# Patient Record
Sex: MALE | Race: WHITE | Employment: STUDENT | URBAN - METROPOLITAN AREA
[De-identification: names, ages, dates, MRNs, and addresses within clinical notes are randomized per-mention and may not be internally consistent; named-entity substitution may affect disease eponyms.]

---
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
## 2024-07-30 ENCOUNTER — HOSPITAL ENCOUNTER (OUTPATIENT)
Age: 19
Discharge: HOME | End: 2024-07-30
Payer: COMMERCIAL

## 2024-07-30 ENCOUNTER — APPOINTMENT (OUTPATIENT)
Dept: GENERAL RADIOLOGY | Age: 19
End: 2024-07-30
Attending: NURSE PRACTITIONER
Payer: COMMERCIAL

## 2024-07-30 VITALS
TEMPERATURE: 98 F | RESPIRATION RATE: 20 BRPM | OXYGEN SATURATION: 98 % | HEIGHT: 73 IN | SYSTOLIC BLOOD PRESSURE: 127 MMHG | BODY MASS INDEX: 23.19 KG/M2 | DIASTOLIC BLOOD PRESSURE: 76 MMHG | HEART RATE: 67 BPM | WEIGHT: 175 LBS

## 2024-07-30 DIAGNOSIS — S99.921A RIGHT FOOT INJURY: Primary | ICD-10-CM

## 2024-07-30 PROCEDURE — 99203 OFFICE O/P NEW LOW 30 MIN: CPT | Performed by: NURSE PRACTITIONER

## 2024-07-30 PROCEDURE — A6448 LT COMPRES BAND <3"/YD: HCPCS | Performed by: NURSE PRACTITIONER

## 2024-07-30 PROCEDURE — 73630 X-RAY EXAM OF FOOT: CPT | Performed by: NURSE PRACTITIONER

## 2024-07-30 NOTE — DISCHARGE INSTRUCTIONS
Rest  Ice  Compress   Elevate    Take Aleve as directed.     Follow up with Podiatry if not improving in the next week.

## 2024-07-30 NOTE — ED PROVIDER NOTES
Patient Seen in: [facility]      History     Chief Complaint   Patient presents with    Foot Pain     Stated Complaint: right foot injury x 7 days    Subjective:   Right foot pain after playing soccer19-year-old male presents with 7 days ago and accidentally kicking another players shoe.  Patient applied ice and took Aleve the first 2 days.  Leaving for college in a couple of days.    Denies numbness or tingling.    The history is provided by the patient.           Objective:   Past Medical History:    Attention or concentration deficit    Laryngomalacia              Past Surgical History:   Procedure Laterality Date    Other surgical history  7/04    Laryngomalacia                Social History     Socioeconomic History    Marital status: Single   Tobacco Use    Smoking status: Never     Passive exposure: Never    Smokeless tobacco: Never   Vaping Use    Vaping status: Never Used   Substance and Sexual Activity    Alcohol use: No    Drug use: No   Other Topics Concern    Caffeine Concern No    Exercise Yes     Comment: 4-5 days per week    Seat Belt Yes              Review of Systems   Neurological:  Negative for weakness and numbness.       Positive for stated Chief Complaint: Foot Pain    Other systems are as noted in HPI.  Constitutional and vital signs reviewed.      All other systems reviewed and negative except as noted above.    Physical Exam     ED Triage Vitals [07/30/24 1410]   /63   Pulse 57   Resp 20   Temp 98.6 °F (37 °C)   Temp src Temporal   SpO2 99 %   O2 Device None (Room air)       Current Vitals:   Vital Signs  BP: 127/76  Pulse: 67  Resp: 20  Temp: 98.2 °F (36.8 °C)  Temp src: Temporal    Oxygen Therapy  SpO2: 98 %  O2 Device: None (Room air)            Physical Exam  Constitutional:       Appearance: Normal appearance.   Musculoskeletal:      Right foot: Normal range of motion. Tenderness (mild ttp to dorsal aspect of right foot near area of cuboid bone.) present.  No laceration. Normal pulse.   Feet:      Right foot:      Skin integrity: No ulcer or skin breakdown.   Skin:     General: Skin is warm and dry.   Neurological:      Mental Status: He is alert.   Psychiatric:         Mood and Affect: Mood normal.               ED Course   Labs Reviewed - No data to display  XR FOOT, COMPLETE (MIN 3 VIEWS), RIGHT (CPT=73630)          PROCEDURE:  XR FOOT, COMPLETE (MIN 3 VIEWS), RIGHT (CPT=73630)     TECHNIQUE:  AP, oblique, and lateral views were obtained.     COMPARISON:  None.     INDICATIONS:  right foot injury x 7 days     PATIENT STATED HISTORY: (As transcribed by Technologist)  Soccer injury 1 week ago. Pain to medial tarsal area.                          =====  CONCLUSION:  There is no acute fracture or dislocation involving the right foot.     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        LOCATION:  [id number]        Dictated by (CST): Shelly Ramirez MD on 7/30/2024 at 3:15 PM       Finalized by (CST): Shlely Ramirez MD on 7/30/2024 at 3:16 PM                             MDM                Medical Decision Making  Right foot pain after playing soccer19-year-old male presents with 7 days ago and accidentally kicking another players shoe.  Diff dx include fracture vs sprain.   Xray does not reveal fracture.   Ace Wrap provided.   RICE  Ibuprofen with food every 6 hours.   Follow up with Ortho if not improving in the next 3 days.   Pt in agreement and understands plan.       Amount and/or Complexity of Data Reviewed  Radiology: ordered.    Risk  OTC drugs.        Disposition and Plan     Clinical Impression:  1. Right foot injury         Disposition:  Discharge  7/30/2024  3:18 pm    Follow-up:  Sadie Montemayor, LANI  [address] [place] [id number]  [phone]    Schedule an appointment as soon as possible for a visit   If symptoms worsen          Medications Prescribed:  Discharge Medication List as of 7/30/2024  3:19 PM

## 2025-07-25 ENCOUNTER — HOSPITAL ENCOUNTER (OUTPATIENT)
Age: 20
Discharge: HOME | End: 2025-07-25
Payer: COMMERCIAL

## 2025-07-25 VITALS
HEART RATE: 56 BPM | DIASTOLIC BLOOD PRESSURE: 80 MMHG | TEMPERATURE: 98 F | RESPIRATION RATE: 16 BRPM | OXYGEN SATURATION: 99 % | SYSTOLIC BLOOD PRESSURE: 126 MMHG

## 2025-07-25 DIAGNOSIS — B08.4 HAND, FOOT AND MOUTH DISEASE: Primary | ICD-10-CM

## 2025-07-25 LAB — POCT MONO: NEGATIVE

## 2025-07-25 PROCEDURE — 99214 OFFICE O/P EST MOD 30 MIN: CPT | Performed by: NURSE PRACTITIONER

## 2025-07-25 PROCEDURE — 86308 HETEROPHILE ANTIBODY SCREEN: CPT | Performed by: NURSE PRACTITIONER

## 2025-07-25 RX ORDER — LIDOCAINE HYDROCHLORIDE 20 MG/ML
5 SOLUTION OROPHARYNGEAL
Qty: 100 ML | Refills: 0 | Status: SHIPPED | OUTPATIENT
Start: 2025-07-25

## 2025-07-25 NOTE — ED PROVIDER NOTES
Patient Seen in: [facility]       The following individual(s) verbally consented to be recorded using ambient AI listening technology and understand that they can each withdraw their consent to this listening technology at any point by asking the clinician to turn off or pause the recording:    Patient name: Dewayne Talbert  Additional names:  Kayce Talbert    History  Chief Complaint   Patient presents with    Rash Skin Problem    Sore Throat     Stated Complaint: Sore Throat; Hives    Subjective:   HPI     Dewayne Talbert is a 20 year old male who presents with hives and sore throat.    He has been experiencing hives and a sore throat, which he describes as red. A strep throat test was negative, and he is awaiting results for a mononucleosis test.    He experienced a fever on Wednesday, describing it as 'like a nightmare,' but it was not severe. Initially, he thought the sore throat would resolve on its own, but it worsened, and he developed mouth sores.    He woke up with a pustule in his mouth. He describes the throat as 'beefy.' No shortness of breath, but he feels uncomfortable.    He was prescribed amoxicillin but has only taken one dose last night. He developed a rash two days ago.    He is involved in contact sports and is supposed to participate in an event in a couple of weeks.        Objective:     Past Medical History:    Attention or concentration deficit    Laryngomalacia              Past Surgical History:   Procedure Laterality Date    Other surgical history  7/04    Laryngomalacia                Social History     Socioeconomic History    Marital status: Single   Tobacco Use    Smoking status: Never     Passive exposure: Never    Smokeless tobacco: Never   Vaping Use    Vaping status: Never Used   Substance and Sexual Activity    Alcohol use: No    Drug use: No   Other Topics Concern    Caffeine Concern No    Exercise Yes     Comment: 4-5 days per week    Seat Belt Yes               Review of Systems    Positive for stated complaint: Sore Throat; Hives  Other systems are as noted in HPI.  Constitutional and vital signs reviewed.      All other systems reviewed and negative except as noted above.                  Physical Exam    ED Triage Vitals   BP 07/25/25 0949 126/80   Pulse 07/25/25 0948 56   Resp 07/25/25 0948 16   Temp 07/25/25 0948 98.1 °F (36.7 °C)   Temp src 07/25/25 0948 Oral   SpO2 07/25/25 0948 99 %   O2 Device 07/25/25 0948 None (Room air)       Current Vitals:   Vital Signs  BP: 126/80  Pulse: 56  Resp: 16  Temp: 98.1 °F (36.7 °C)  Temp src: Oral    Oxygen Therapy  SpO2: 99 %  O2 Device: None (Room air)        Pertinent physical exam:      HEENT: Throat erythematous.       Physical Exam  Pustules noted to the lips nose and inside his mouth, rash to bottom of his feet along with palms and dorsal hands.        ED Course  Labs Reviewed   POCT MONO TEST - Normal           MDM       Medical Decision Making  Problems Addressed:  Hand, foot and mouth disease: acute illness or injury    Amount and/or Complexity of Data Reviewed  Independent Historian: parent  Labs: ordered. Decision-making details documented in ED Course.    Risk  OTC drugs.  Prescription drug management.      Assessment & Plan  Sore throat  Sore throat with redness, negative strep test. Viral infection suspected.    Mononucleosis (suspected)  Suspected mononucleosis due to sore throat, fever, rash. Discussed spleen enlargement risks and contact sports.  - Perform rapid mononucleosis test.  - Advise against contact sports until follow-up with primary care physician to assess spleen condition.    Mouth sores  Mouth sores possibly related to viral infection.    Fever  Fever as part of systemic symptoms including body aches and chills.    Rash  Suspected hand-foot-and-mouth/coxsackie virus        Disposition and Plan     Clinical Impression:  1. Hand, foot and mouth disease          Disposition:  Discharge  7/25/2025 10:59 am    Follow-up:  Kvng Scott MD  [address] [address]  [phone]                Medications Prescribed:  Discharge Medication List as of 7/25/2025 11:00 AM        START taking these medications    Details   Lidocaine Viscous HCl 2 % Mouth/Throat Solution Take 5 mL by mouth every 3 (three) hours as needed for Pain., Normal, Disp-100 mL, R-0                   Supplementary Documentation:

## 2025-07-25 NOTE — ED INITIAL ASSESSMENT (HPI)
Seen at another clinic yesterday.  Negative strep.  Awaiting Mono results.  Painful rash w some drainage, all over body including mouth.  Onset 3 days.

## (undated) DIAGNOSIS — M25.572 LEFT ANKLE PAIN, UNSPECIFIED CHRONICITY: Primary | ICD-10-CM

## (undated) NOTE — LETTER
Date: 3/7/2022    Patient Name: Darryle Cliff          To Whom it may concern: This letter has been written at the patient's request. The above patient was seen at the [facility] for treatment of a medical condition. This patient should be excused from attending school on Monday, 3/7/2022. The patient may return to school on Tuesday, 3/8/2022. Sincerely,    Susan Gonzales MMS, PA-C Orthopedic Surgery / Sports Medicine Specialist  [facility]  [address]   [facility]. [facility]  [email or web address]  t: [phone]  o: [phone]  f: [phone]          This note was dictated using Dragon software. While it was briefly proofread prior to completion, some grammatical, spelling, and word choice errors due to dictation may still occur.

## (undated) NOTE — MR AVS SNAPSHOT
[facility] [address] [address] [phone]               Thank you for choosing us for your health care visit with Celso Roman DO.   We are glad to serve you and happy to provide you with this summa eardrum flexibility is often linked with fluid buildup. Checking the Middle Ear  Your child’s eardrum and middle ear may be tested. Tympanometry and acoustic reflex testing may be done. Both use a probe to send air and sound through the outer ear.  Tympano Return if symptoms worsen or fail to improve. Anonymesshart     Sign up for [employer] access for your child. [employer] access allows you to view health information for your child from their recent   visit, view other health information and more.   To sign u o grow a family garden    In addition to 11, 4, 3, 2, 1 families can make small changes in their family routines to help everyone lead healthier active lives.  Try:  o Eating breakfast everyday  o Eating low-fat dairy products like yogurt, milk, and cheese